# Patient Record
Sex: FEMALE | Race: OTHER | HISPANIC OR LATINO | URBAN - METROPOLITAN AREA
[De-identification: names, ages, dates, MRNs, and addresses within clinical notes are randomized per-mention and may not be internally consistent; named-entity substitution may affect disease eponyms.]

---

## 2021-02-14 ENCOUNTER — EMERGENCY (EMERGENCY)
Facility: HOSPITAL | Age: 31
LOS: 1 days | Discharge: HOME | End: 2021-02-14
Attending: EMERGENCY MEDICINE | Admitting: EMERGENCY MEDICINE
Payer: MEDICAID

## 2021-02-14 VITALS
HEART RATE: 79 BPM | OXYGEN SATURATION: 100 % | DIASTOLIC BLOOD PRESSURE: 79 MMHG | TEMPERATURE: 99 F | SYSTOLIC BLOOD PRESSURE: 152 MMHG | RESPIRATION RATE: 16 BRPM

## 2021-02-14 VITALS
DIASTOLIC BLOOD PRESSURE: 78 MMHG | OXYGEN SATURATION: 98 % | RESPIRATION RATE: 18 BRPM | HEART RATE: 77 BPM | SYSTOLIC BLOOD PRESSURE: 132 MMHG

## 2021-02-14 LAB
ALBUMIN SERPL ELPH-MCNC: 2.8 G/DL — LOW (ref 3.3–5)
ALP SERPL-CCNC: 85 U/L — SIGNIFICANT CHANGE UP (ref 40–120)
ALT FLD-CCNC: 24 U/L — SIGNIFICANT CHANGE UP (ref 12–78)
ANION GAP SERPL CALC-SCNC: 8 MMOL/L — SIGNIFICANT CHANGE UP (ref 5–17)
APPEARANCE UR: ABNORMAL
AST SERPL-CCNC: 16 U/L — SIGNIFICANT CHANGE UP (ref 15–37)
BASOPHILS # BLD AUTO: 0.01 K/UL — SIGNIFICANT CHANGE UP (ref 0–0.2)
BASOPHILS NFR BLD AUTO: 0.1 % — SIGNIFICANT CHANGE UP (ref 0–2)
BILIRUB SERPL-MCNC: 0.2 MG/DL — SIGNIFICANT CHANGE UP (ref 0.2–1.2)
BILIRUB UR-MCNC: NEGATIVE — SIGNIFICANT CHANGE UP
BUN SERPL-MCNC: 6 MG/DL — LOW (ref 7–23)
CALCIUM SERPL-MCNC: 8.5 MG/DL — SIGNIFICANT CHANGE UP (ref 8.5–10.1)
CHLORIDE SERPL-SCNC: 108 MMOL/L — SIGNIFICANT CHANGE UP (ref 96–108)
CO2 SERPL-SCNC: 23 MMOL/L — SIGNIFICANT CHANGE UP (ref 22–31)
COLOR SPEC: YELLOW — SIGNIFICANT CHANGE UP
CREAT SERPL-MCNC: 0.46 MG/DL — LOW (ref 0.5–1.3)
DIFF PNL FLD: ABNORMAL
EOSINOPHIL # BLD AUTO: 0.12 K/UL — SIGNIFICANT CHANGE UP (ref 0–0.5)
EOSINOPHIL NFR BLD AUTO: 1.1 % — SIGNIFICANT CHANGE UP (ref 0–6)
EPI CELLS # UR: SIGNIFICANT CHANGE UP
GLUCOSE SERPL-MCNC: 121 MG/DL — HIGH (ref 70–99)
GLUCOSE UR QL: NEGATIVE — SIGNIFICANT CHANGE UP
HCT VFR BLD CALC: 38.2 % — SIGNIFICANT CHANGE UP (ref 34.5–45)
HGB BLD-MCNC: 13 G/DL — SIGNIFICANT CHANGE UP (ref 11.5–15.5)
IMM GRANULOCYTES NFR BLD AUTO: 0.9 % — SIGNIFICANT CHANGE UP (ref 0–1.5)
KETONES UR-MCNC: NEGATIVE — SIGNIFICANT CHANGE UP
LEUKOCYTE ESTERASE UR-ACNC: NEGATIVE — SIGNIFICANT CHANGE UP
LYMPHOCYTES # BLD AUTO: 1.89 K/UL — SIGNIFICANT CHANGE UP (ref 1–3.3)
LYMPHOCYTES # BLD AUTO: 17.6 % — SIGNIFICANT CHANGE UP (ref 13–44)
MCHC RBC-ENTMCNC: 29.8 PG — SIGNIFICANT CHANGE UP (ref 27–34)
MCHC RBC-ENTMCNC: 34 GM/DL — SIGNIFICANT CHANGE UP (ref 32–36)
MCV RBC AUTO: 87.6 FL — SIGNIFICANT CHANGE UP (ref 80–100)
MONOCYTES # BLD AUTO: 0.67 K/UL — SIGNIFICANT CHANGE UP (ref 0–0.9)
MONOCYTES NFR BLD AUTO: 6.2 % — SIGNIFICANT CHANGE UP (ref 2–14)
NEUTROPHILS # BLD AUTO: 7.94 K/UL — HIGH (ref 1.8–7.4)
NEUTROPHILS NFR BLD AUTO: 74.1 % — SIGNIFICANT CHANGE UP (ref 43–77)
NITRITE UR-MCNC: NEGATIVE — SIGNIFICANT CHANGE UP
NRBC # BLD: 0 /100 WBCS — SIGNIFICANT CHANGE UP (ref 0–0)
PH UR: 7 — SIGNIFICANT CHANGE UP (ref 5–8)
PLATELET # BLD AUTO: 354 K/UL — SIGNIFICANT CHANGE UP (ref 150–400)
POTASSIUM SERPL-MCNC: 4 MMOL/L — SIGNIFICANT CHANGE UP (ref 3.5–5.3)
POTASSIUM SERPL-SCNC: 4 MMOL/L — SIGNIFICANT CHANGE UP (ref 3.5–5.3)
PROT SERPL-MCNC: 7 G/DL — SIGNIFICANT CHANGE UP (ref 6–8.3)
PROT UR-MCNC: NEGATIVE — SIGNIFICANT CHANGE UP
RBC # BLD: 4.36 M/UL — SIGNIFICANT CHANGE UP (ref 3.8–5.2)
RBC # FLD: 12.8 % — SIGNIFICANT CHANGE UP (ref 10.3–14.5)
RBC CASTS # UR COMP ASSIST: SIGNIFICANT CHANGE UP /HPF (ref 0–4)
SODIUM SERPL-SCNC: 139 MMOL/L — SIGNIFICANT CHANGE UP (ref 135–145)
SP GR SPEC: 1 — LOW (ref 1.01–1.02)
UROBILINOGEN FLD QL: NEGATIVE — SIGNIFICANT CHANGE UP
WBC # BLD: 10.73 K/UL — HIGH (ref 3.8–10.5)
WBC # FLD AUTO: 10.73 K/UL — HIGH (ref 3.8–10.5)

## 2021-02-14 PROCEDURE — 85025 COMPLETE CBC W/AUTO DIFF WBC: CPT

## 2021-02-14 PROCEDURE — 81001 URINALYSIS AUTO W/SCOPE: CPT

## 2021-02-14 PROCEDURE — 80053 COMPREHEN METABOLIC PANEL: CPT

## 2021-02-14 PROCEDURE — 99283 EMERGENCY DEPT VISIT LOW MDM: CPT

## 2021-02-14 PROCEDURE — 36415 COLL VENOUS BLD VENIPUNCTURE: CPT

## 2021-02-14 PROCEDURE — 99284 EMERGENCY DEPT VISIT MOD MDM: CPT

## 2021-02-14 RX ORDER — ACETAMINOPHEN 500 MG
650 TABLET ORAL ONCE
Refills: 0 | Status: COMPLETED | OUTPATIENT
Start: 2021-02-14 | End: 2021-02-14

## 2021-02-14 RX ADMIN — Medication 650 MILLIGRAM(S): at 12:19

## 2021-02-14 NOTE — ED ADULT NURSE NOTE - NSFALLRSKPASTHIST_ED_ALL_ED
Pt was upset because he said he saw dr mckeon 3 weeks ago and that appt should have taken care of his visitation for med refills please advise, on if that should be ok or he should make an additional appt WITH yonny    no

## 2021-02-14 NOTE — ED PROVIDER NOTE - NSFOLLOWUPINSTRUCTIONS_ED_ALL_ED_FT
Hipertensión en los adultos    Hypertension, Adult      La presión arterial marybel (hipertensión) se produce cuando la fuerza de la josemanuel bombea a través de las arterias con mucha fuerza. Las arterias son los vasos sanguíneos que transportan la josemanuel desde el corazón al nick del cuerpo. La hipertensión hace que el corazón yamilka más esfuerzo para bombear josemanuel y puede provocar que las arterias se estrechen o endurezcan. La hipertensión no tratada o no controlada puede causar infarto de miocardio, insuficiencia cardíaca, accidente cerebrovascular, enfermedad renal y otros problemas.    David lectura de la presión arterial consta de un número más alto sobre un número más bajo. En condiciones ideales, la presión arterial debe estar por debajo de 120/80. El primer número (“superior”) es la presión sistólica. Es la medida de la presión de las arterias cuando el corazón late. El bella número (“inferior”) es la presión diastólica. Es la medida de la presión en las arterias cuando el corazón se relaja.      ¿Cuáles son las causas?    Se desconoce la causa exacta de esta afección. Hay algunas afecciones que causan presión arterial marybel o están relacionadas con norm.      ¿Qué incrementa el riesgo?  Algunos factores de riesgo de hipertensión están bajo lyn control. Los siguientes factores pueden hacer que sea más propenso a desarrollar esta afección:  •Fumar.      •Tener diabetes mellitus tipo 2, colesterol alto, o ambos.      •No hacer la cantidad suficiente de actividad física o ejercicio.      •Tener sobrepeso.      •Consumir mucha grasa, azúcar, calorías o sal (sodio) en lyn dieta.      •Beber alcohol en exceso.    Algunos factores de riesgo para la presión arterial marybel pueden ser difíciles o imposibles de cambiar. Algunos de estos factores son los siguientes:  •Tener enfermedad renal crónica.      •Tener antecedentes familiares de presión arterial marybel.      •Edad. Los riesgos aumentan con la edad.      •Abel. El riesgo es mayor para las personas afroamericanas.      •Sexo. Antes de los 45 años, los hombres corren más riesgo que las mujeres. Después de los 65 años, las mujeres corren más riesgo que los hombres.      •Tener apnea obstructiva del sueño.      •Estrés.        ¿Cuáles son los signos o los síntomas?  Es posible que la presión arterial marybel puede no cause síntomas. La presión arterial muy marybel (crisis hipertensiva) puede provocar:  •Dolor de hipolito.      •Ansiedad.      •Falta de aire.      •Hemorragia nasal.      •Náuseas y vómitos.      •Cambios en la visión.      •Dolor de pecho intenso.      •Convulsiones.        ¿Cómo se diagnostica?    Esta afección se diagnostica al medir lyn presión arterial mientras se encuentra sentado, con el brazo apoyado sobre david superficie plana, las piernas sin cruzar y los pies dara apoyados en el piso. El brazalete del tensiómetro debe colocarse directamente sobre la piel de la parte superior del brazo y al nivel de lyn corazón. Debe medirla al menos dos veces en el mismo brazo. Determinadas condiciones pueden causar david diferencia de presión arterial entre el brazo anil y el derecho.  Ciertos factores pueden provocar que las lecturas de la presión arterial britany inferiores o superiores a lo normal por un período corto de tiempo:  •Si lyn presión arterial es más marybel cuando se encuentra en el consultorio del médico que cuando la mide en lyn hogar, se denomina “hipertensión de bata anatoliy”. La mayoría de las personas que tienen esta afección no deben ser medicadas.      •Si lyn presión arterial es más marybel en el hogar que cuando se encuentra en el consultorio del médico, se denomina “hipertensión enmascarada”. La mayoría de las personas que tienen esta afección deben ser medicadas para controlar la presión arterial.    Si tiene david lecturas de presión arterial marybel corbin david visita o si tiene presión arterial normal con otros factores de riesgo, se le podrá pedir que yamilka lo siguiente:  •Que regrese otro día para volver a controlar lyn presión arterial nuevamente.      •Que se controle la presión arterial en lyn casa corbin 1 semana o más.      Si se le diagnostica hipertensión, es posible que se le realicen otros análisis de josemanuel o estudios de diagnóstico por imágenes para ayudar a lyn médico a comprender lyn riesgo general de tener otras afecciones.      ¿Cómo se trata?  Esta afección se trata haciendo cambios saludables en el estilo de luis enrique, tales thanh ingerir alimentos saludables, realizar más ejercicio y reducir el consumo de alcohol. El médico puede recetarle medicamentos si los cambios en el estilo de luis enrique no son suficientes para lograr controlar la presión arterial y si:  •Lyn presión arterial sistólica está por encima de 130.      •Lyn presión arterial diastólica está por encima de 80.      La presión arterial deseada puede variar en función de las enfermedades, la edad y otros factores personales.      Siga estas instrucciones en lyn casa:      Comida y bebida    •Siga david dieta con alto contenido de fibras y potasio, y con bajo contenido de sodio, azúcar agregada y grasas. Un ejemplo de plan alimenticio es la dieta DASH (Dietary Approaches to Stop Hypertension, Métodos alimenticios para detener la hipertensión). Para alimentarse de esta manera:  •Coma mucha fruta y verdura fresca. Trate de que la mitad del plato de cada comida sea de frutas y verduras.      •Coma cereales integrales, thanh pasta integral, arroz integral o pan integral. Llene aproximadamente un cuarto del plato con cereales integrales.      •Coma y joshua productos lácteos con bajo contenido de grasa, thanh leche descremada o yogur bajo en grasas.      •Evite la ingesta de conley de carne grasa, carne procesada o curada, y carne de ave con piel. Llene aproximadamente un cuarto del plato con proteínas magras, thanh pescado, natalie sin piel, frijoles, huevos o tofu.      •Evite ingerir alimentos prehechos y procesados. En general, estos tienen mayor cantidad de sodio, azúcar agregada y grasa.        •Reduzca lyn ingesta diaria de sodio. La mayoría de las personas que tienen hipertensión deben comer menos de 1500 mg de sodio por día.    • No joshua alcohol si:  •Lyn médico le indica no hacerlo.      •Está embarazada, puede estar embarazada o está tratando de quedar embarazada.      •Si jaimie alcohol:•Limite la cantidad que jaimie a lo siguiente:  •De 0 a 1 medida por día para las mujeres.      •De 0 a 2 medidas por día para los hombres.        •Esté atento a la cantidad de alcohol que hay en las bebidas que karen. En los [place], david medida equivale a david botella de cerveza de 12 oz (355 ml), un vaso de vino de 5 oz (148 ml) o un vaso de david bebida alcohólica de marybel graduación de 1½ oz (44 ml).          Estilo de luis enrique      •Trabaje con lyn médico para mantener un peso saludable o perder peso. Pregúntele cuál es el peso recomendado para usted.      •Yamilka al menos 30 minutos de ejercicio la mayoría de los días de la semana. Estas actividades pueden incluir caminar, nadar o andar en bicicleta.      •Incluya ejercicios para fortalecer evens músculos (ejercicios de resistencia), thanh [place] o levantamiento de pesas, thanh parte de lyn rutina semanal de ejercicios. Intente realizar 30 minutos de laine tipo de ejercicios al menos heather días a la semana.      • No consuma ningún producto que contenga nicotina o tabaco, thanh cigarrillos, cigarrillos electrónicos y tabaco de mascar. Si necesita ayuda para dejar de fumar, consulte al médico.      •Contrólese la presión arterial en lyn casa según las indicaciones del médico.      •Concurra a todas las visitas de seguimiento thanh se lo haya indicado el médico. Casar es importante.      Medicamentos     •Penermon los medicamentos de venta jason y los recetados solamente thanh se lo haya indicado el médico. Siga cuidadosamente las indicaciones. Los medicamentos para la presión arterial deben tomarse según las indicaciones.      • No omita las dosis de medicamentos para la presión arterial. Si lo hace, estará en riesgo de tener problemas y puede hacer que los medicamentos britany menos eficaces.      •Pregúntele a lyn médico a qué efectos secundarios o reacciones a los medicamentos debe prestar atención.        Comuníquese con un médico si:    •Piensa que tiene david reacción a un medicamento que está tomando.      •Tiene jamison de hipolito frecuentes (recurrentes).      •Se siente mareado.      •Tiene hinchazón en los tobillos.      •Tiene problemas de visión.        Solicite ayuda inmediatamente si:    •Siente un dolor de hipolito intenso o confusión.      •Siente debilidad inusual o adormecimiento.      •Siente que va a desmayarse.      •Siente un dolor intenso en el pecho o el abdomen.      •Vomita repetidas veces.      •Tiene dificultad para respirar.        Resumen    •La hipertensión se produce cuando la josemanuel bombea en las arterias con mucha fuerza. Si esta afección no se controla, podría correr riesgo de tener complicaciones graves.      •La presión arterial deseada puede variar en función de las enfermedades, la edad y otros factores personales. Para la mayoría de las personas, david presión arterial normal es ce que 120/80.      •La hipertensión se trata con cambios en el estilo de luis enrique, medicamentos o david combinación de ambos. Los cambios en el estilo de luis enrique incluyen pérdida de peso, ingerir alimentos sanos, seguir david dieta baja en sodio, hacer más ejercicio y limitar el consumo de alcohol.      Esta información no tiene thanh fin reemplazar el consejo del médico. Asegúrese de hacerle al médico cualquier pregunta que tenga.      Document Revised: 10/03/2019 Document Reviewed: 10/03/2019    Elsevier Patient Education © 2020 [employer]

## 2021-02-14 NOTE — ED PROVIDER NOTE - PROGRESS NOTE DETAILS
Headache 3/10. Improved.  All results were explained to patient and/or family and a copy of all available results given.  case dw Dr. Sheldon Garcia covering [facility].  Pt instructed to go to [facility] at [place] as requested by Jose after dc from here.

## 2021-02-14 NOTE — ED PROVIDER NOTE - PATIENT PORTAL LINK FT
You can access the FollowMyHealth Patient Portal offered by [facility] by registering at the following website: [email or web address]. By joining [employer] [facility]’s FollowMyHealth portal, you will also be able to view your health information using other applications (apps) compatible with our system.

## 2021-02-14 NOTE — ED ADULT NURSE NOTE - OBJECTIVE STATEMENT
Received pt alert and orientated. Pt is complaining of HTN. Pt states her OB which is Dr Carty wanted her to check her BP  service was George # [id number]. Pt states she gets migraines pt states her headache is slight right now denies blurry vision and vomiting. Pt is denying chest pain and SOB and abd pain. Safety/Comfort maintained. Call bell within reach. Freq rounding performed. Will continue to monitor. Pt is able to walk and use the bathroom.

## 2021-02-14 NOTE — ED ADULT NURSE NOTE - NSIMPLEMENTINTERV_GEN_ALL_ED
Implemented All Universal Safety Interventions:  [place] to call system. Call bell, personal items and telephone within reach. Instruct patient to call for assistance. Room bathroom lighting operational. Non-slip footwear when patient is off stretcher. Physically safe environment: no spills, clutter or unnecessary equipment. Stretcher in lowest position, wheels locked, appropriate side rails in place.

## 2021-02-14 NOTE — ED PROVIDER NOTE - OBJECTIVE STATEMENT
[id number].  Bp was high today 148/76. Baseline 120/80.  ob- inna Carty .  HO high blood pressure  since Feb 5th , 2021, instructed by ob to take bp daily.  Pt is not on any bp med.  25 week pregnant.  [id number].  Bp was high today 148/76. Baseline 120/80.  ob- inna Carty .  HO high blood pressure  since Feb 5th , 2021, instructed by ob to take bp daily.  Pt is not on any bp med.  25 week pregnant.  Frontal headache 6/10.  Worse last night.  Pt took Tylenol s relief last night.